# Patient Record
Sex: FEMALE | URBAN - METROPOLITAN AREA
[De-identification: names, ages, dates, MRNs, and addresses within clinical notes are randomized per-mention and may not be internally consistent; named-entity substitution may affect disease eponyms.]

---

## 2024-01-04 ENCOUNTER — OFFICE VISIT (OUTPATIENT)
Dept: AUDIOLOGY | Age: 79
End: 2024-01-04
Payer: MEDICARE

## 2024-01-04 DIAGNOSIS — H90.3 SENSORY HEARING LOSS, BILATERAL: Primary | ICD-10-CM

## 2024-01-04 PROCEDURE — 92557 COMPREHENSIVE HEARING TEST: CPT

## 2024-01-04 PROCEDURE — 92567 TYMPANOMETRY: CPT

## 2024-01-04 NOTE — PROGRESS NOTES
HEARING EVALUATION    Name:  Leonila Cleveland  :  1945  Age:  78 y.o.   MRN:  [id number]  Date of Evaluation: 24     HISTORY:     Family concerns    Reason for visit: Leonila Cleveland is being seen today at the request of Dr. Mota for an initial  evaluation of hearing.  Patient reports that her  has concerns regarding her hearing. She notes that her father had hearing loss.  Patient denies otalgia, otorrhea, dizziness, fullness, tinnitus, and noise exposure.    EVALUATION:    Otoscopic Evaluation:   Right Ear: Unremarkable, canal clear   Left Ear: Unremarkable, canal clear    Tympanometry:   Right Ear: Type A; normal middle ear pressure and static compliance    Left Ear: Type A; normal middle ear pressure and static compliance     Speech Audiometry:  Speech Reception (SRT)   Right Ear: 25 dB HL   Left Ear: 35 dB HL    Word Recognition Scores (WRS):  Right Ear: excellent (100 % correct)     Left Ear: excellent (96 % correct)   Stimuli: [id number]    Pure Tone Audiometry:  Conventional pure tone audiometry from 250 - 8000 Hz  was obtained with good reliability and revealed the following:     Right Ear:mild sloping to moderately severe sensorineural hearing loss (SNHL)      Left Ear: mild sloping to severe sensorineural hearing loss (SNHL)       *see attached audiogram    IMPRESSIONS:   Results revealed mild sloping to moderately severe sensorineural hearing loss in the right and mild sloping to severe sensorineural hearing loss in the left.    RECOMMENDATIONS:  Annual hearing eval, Return to [facility] for F/U, Hearing Aid Evaluation, and Copy to Patient/Caregiver    PATIENT EDUCATION:   The results of today's results and recommendations were reviewed with Ms. Cleveland and her hearing thresholds were explained at length. Treatment options, including amplification and communication strategies, were discussed as appropriate. Ms. Cleveland voiced understanding of her test results. Questions were addressed  and the patient was encouraged to contact our department should concerns arise.      Dorian Herzog., [facility]  Clinical Audiologist  [facility] AUDIOLOGY  [facility] [address]  [place] [address]